# Patient Record
Sex: FEMALE | Race: BLACK OR AFRICAN AMERICAN | NOT HISPANIC OR LATINO | Employment: UNEMPLOYED | URBAN - METROPOLITAN AREA
[De-identification: names, ages, dates, MRNs, and addresses within clinical notes are randomized per-mention and may not be internally consistent; named-entity substitution may affect disease eponyms.]

---

## 2023-06-28 ENCOUNTER — NURSING HOME VISIT (OUTPATIENT)
Dept: POST ACUTE CARE | Facility: EXTERNAL LOCATION | Age: 68
End: 2023-06-28
Payer: COMMERCIAL

## 2023-06-28 DIAGNOSIS — R53.1 WEAKNESS GENERALIZED: ICD-10-CM

## 2023-06-28 DIAGNOSIS — K44.9 HERNIA, HIATAL: ICD-10-CM

## 2023-06-28 DIAGNOSIS — M54.50 CHRONIC BILATERAL LOW BACK PAIN, UNSPECIFIED WHETHER SCIATICA PRESENT: ICD-10-CM

## 2023-06-28 DIAGNOSIS — I10 PRIMARY HYPERTENSION: ICD-10-CM

## 2023-06-28 DIAGNOSIS — F33.41 RECURRENT MAJOR DEPRESSIVE DISORDER, IN PARTIAL REMISSION (CMS-HCC): ICD-10-CM

## 2023-06-28 DIAGNOSIS — G89.29 CHRONIC BILATERAL LOW BACK PAIN, UNSPECIFIED WHETHER SCIATICA PRESENT: ICD-10-CM

## 2023-06-28 DIAGNOSIS — J44.9 CHRONIC OBSTRUCTIVE PULMONARY DISEASE, UNSPECIFIED COPD TYPE (MULTI): Primary | ICD-10-CM

## 2023-06-28 PROCEDURE — 99305 1ST NF CARE MODERATE MDM 35: CPT | Performed by: INTERNAL MEDICINE

## 2023-06-28 NOTE — LETTER
Patient: Berny Mohr  : 1955    Encounter Date: 2023    HISTORY & PHYSICAL    Subjective  Chief complaint: Berny Mohr is a 67 y.o. female who is a acute skilled care patient being seen and evaluated for multiple medical problems.  Patient presents for weakness.    HPI:  Patient was admitted to the hospital after a fall at home. Patient was seen at bedside and appeared to be depressed. Patient has a substance abuse issue, states she uses substances for pain control. Pt was evaluated by psych, PT and OT and was discharged to a skilled nursing facility.         Past Medical History:   Diagnosis Date   • COPD (chronic obstructive pulmonary disease) (CMS/[facility])    • Depression    • Hypertension, essential    • PVD (peripheral vascular disease) (CMS/[facility])    • Syncope and collapse    • Tachycardia        No past surgical history on file.    Family History   Problem Relation Name Age of Onset   • No Known Problems Mother     • No Known Problems Father         Social History     Socioeconomic History   • Marital status:      Spouse name: Not on file   • Number of children: Not on file   • Years of education: Not on file   • Highest education level: Not on file   Occupational History   • Not on file   Tobacco Use   • Smoking status: Not on file   • Smokeless tobacco: Not on file   Substance and Sexual Activity   • Alcohol use: Not on file   • Drug use: Not on file   • Sexual activity: Not on file   Other Topics Concern   • Not on file   Social History Narrative   • Not on file     Social Determinants of Health     Financial Resource Strain: Not on file   Food Insecurity: Not on file   Transportation Needs: Not on file   Physical Activity: Not on file   Stress: Not on file   Social Connections: Not on file   Intimate Partner Violence: Not on file   Housing Stability: Not on file       Vital signs: 153/81, 98.1, 18, 65, 100%    Objective  Physical Exam  HENT:      Head:  Normocephalic and atraumatic.      Nose: Nose normal.      Mouth/Throat:      Mouth: Mucous membranes are moist.      Pharynx: Oropharynx is clear.   Eyes:      Extraocular Movements: Extraocular movements intact.      Pupils: Pupils are equal, round, and reactive to light.   Cardiovascular:      Rate and Rhythm: Normal rate and regular rhythm.   Pulmonary:      Effort: No respiratory distress.      Breath sounds: Normal breath sounds. No wheezing, rhonchi or rales.   Abdominal:      General: Bowel sounds are normal. There is no distension.      Palpations: Abdomen is soft.      Tenderness: There is no abdominal tenderness. There is no guarding.   Musculoskeletal:      Right lower leg: No edema.      Left lower leg: No edema.   Skin:     General: Skin is warm and dry.   Neurological:      Mental Status: She is alert. Mental status is at baseline.         Assessment/Plan  Problem List Items Addressed This Visit       Hernia, hiatal    Low back pain    Weakness generalized    COPD (chronic obstructive pulmonary disease) (CMS/HCC) - Primary    Primary hypertension    Recurrent major depressive disorder, in partial remission (CMS/[facility])     Medications, treatments, and labs reviewed  Continue medications and treatments as listed in [facility]    Scribe Attestation  I, Dorothy Marino   attest that this documentation has been prepared under the direction and in the presence of Stephon Guerrier DO.    An interactive audio and/or video telecommunication system which permits real time communications between the patient (at the originating site) and provider (at a distant site) was utilized to provide this telehealth service after obtaining verbal consent.    Provider Attestation - Scribe documentation  All medical record entries made by the Scribe were at my direction and personally dictated by me. I have reviewed the chart and agree that the record accurately reflects my personal performance of the history, physical  exam, discussion and plan.    Stephon Guerrier DO          Electronically Signed By: Stephon Guerrier DO   7/10/23  5:52 PM

## 2023-07-03 NOTE — PROGRESS NOTES
HISTORY & PHYSICAL    Subjective   Chief complaint: Berny Mohr is a 67 y.o. female who is a acute skilled care patient being seen and evaluated for multiple medical problems.  Patient presents for weakness.    HPI:  Patient was admitted to the hospital after a fall at home. Patient was seen at bedside and appeared to be depressed. Patient has a substance abuse issue, states she uses substances for pain control. Pt was evaluated by psych, PT and OT and was discharged to a skilled nursing facility.         Past Medical History:   Diagnosis Date    COPD (chronic obstructive pulmonary disease) (CMS/[facility])     Depression     Hypertension, essential     PVD (peripheral vascular disease) (CMS/[facility])     Syncope and collapse     Tachycardia        No past surgical history on file.    Family History   Problem Relation Name Age of Onset    No Known Problems Mother      No Known Problems Father         Social History     Socioeconomic History    Marital status:      Spouse name: Not on file    Number of children: Not on file    Years of education: Not on file    Highest education level: Not on file   Occupational History    Not on file   Tobacco Use    Smoking status: Not on file    Smokeless tobacco: Not on file   Substance and Sexual Activity    Alcohol use: Not on file    Drug use: Not on file    Sexual activity: Not on file   Other Topics Concern    Not on file   Social History Narrative    Not on file     Social Determinants of Health     Financial Resource Strain: Not on file   Food Insecurity: Not on file   Transportation Needs: Not on file   Physical Activity: Not on file   Stress: Not on file   Social Connections: Not on file   Intimate Partner Violence: Not on file   Housing Stability: Not on file       Vital signs: 153/81, 98.1, 18, 65, 100%    Objective   Physical Exam  HENT:      Head: Normocephalic and atraumatic.      Nose: Nose normal.      Mouth/Throat:      Mouth: Mucous membranes are moist.       Pharynx: Oropharynx is clear.   Eyes:      Extraocular Movements: Extraocular movements intact.      Pupils: Pupils are equal, round, and reactive to light.   Cardiovascular:      Rate and Rhythm: Normal rate and regular rhythm.   Pulmonary:      Effort: No respiratory distress.      Breath sounds: Normal breath sounds. No wheezing, rhonchi or rales.   Abdominal:      General: Bowel sounds are normal. There is no distension.      Palpations: Abdomen is soft.      Tenderness: There is no abdominal tenderness. There is no guarding.   Musculoskeletal:      Right lower leg: No edema.      Left lower leg: No edema.   Skin:     General: Skin is warm and dry.   Neurological:      Mental Status: She is alert. Mental status is at baseline.         Assessment/Plan   Problem List Items Addressed This Visit       Hernia, hiatal    Low back pain    Weakness generalized    COPD (chronic obstructive pulmonary disease) (CMS/[facility]) - Primary    Primary hypertension    Recurrent major depressive disorder, in partial remission (CMS/[facility])     Medications, treatments, and labs reviewed  Continue medications and treatments as listed in [facility]    Scribe Attestation  Portia MANNING Scribe   attest that this documentation has been prepared under the direction and in the presence of Stephon Guerrier DO.    An interactive audio and/or video telecommunication system which permits real time communications between the patient (at the originating site) and provider (at a distant site) was utilized to provide this telehealth service after obtaining verbal consent.    Provider Attestation - Scribe documentation  All medical record entries made by the Scribe were at my direction and personally dictated by me. I have reviewed the chart and agree that the record accurately reflects my personal performance of the history, physical exam, discussion and plan.    Stephon Guerrier DO

## 2023-07-10 PROBLEM — J44.9 COPD (CHRONIC OBSTRUCTIVE PULMONARY DISEASE) (MULTI): Status: ACTIVE | Noted: 2023-07-10

## 2023-07-10 PROBLEM — R53.1 WEAKNESS GENERALIZED: Status: ACTIVE | Noted: 2023-07-10

## 2023-07-10 PROBLEM — M54.50 LOW BACK PAIN: Status: ACTIVE | Noted: 2023-07-10

## 2023-07-10 PROBLEM — I10 PRIMARY HYPERTENSION: Status: ACTIVE | Noted: 2023-07-10

## 2023-07-10 PROBLEM — K44.9 HERNIA, HIATAL: Status: ACTIVE | Noted: 2023-07-10

## 2023-07-10 PROBLEM — F33.41 RECURRENT MAJOR DEPRESSIVE DISORDER, IN PARTIAL REMISSION (CMS-HCC): Status: ACTIVE | Noted: 2023-07-10

## 2024-05-07 NOTE — PROGRESS NOTES
"Subjective   Berny Mohr  is a 68 y.o. female who presents for evaluation of lung nodules.     The patient presented to medical attention with cough in December 2022. This had been present for several weeks, and she asked for radiographic imaging which showed a \"upper respiratory tract infection\" she was treated with antibiotics, and her cough transiently resolved but recurred. She presented to the emergency department, where she received a CT scan of the chest and was diagnosed with recurrent pneumonia. She was treated with antinbiotics. The CT scan also showed radiographic abnormalities and she presents for evaluation of these.    Currently the patient is presenting for routine follow-up, in their usual state of health, and \"feeling great\". She has gained some weight . She denies the following symptoms: chest pain, shortness of breath at rest, shortness of breath with activity, cough, hemoptysis, fevers, chills, and weight loss.      There have been no significant changes to their documented medical, surgical and family history.     She  reports that she has quit smoking. Her smoking use included cigarettes. She does not have any smokeless tobacco history on file.    Objective   Physical Exam  Phone visit  Diagnostic Studies  === 05/09/24 ===    CT CHEST WO IV CONTRAST    - Impression -  1.  Stable focal right apical scarring/fibrotic changes.  There are stable subcentimeter parenchymal lung nodules. No evidence  of suspicious lung nodules.  2. Emphysematous changes.  3. Moderate coronary artery calcifications    Signed by: Dexter Hall 5/9/2024 4:27 PM  Dictation workstation:   [id number]    Assessment/Plan   Overall, I believe that the patient is doing well.     Based on the patient's clinical presentation and my review of their radiographic imaging, I believe the lung nodule is unlikely to represent a malignant process.  We discussed various management strategies including surgery, biopsy, " and observation.  Based on this discussion, the patient elected for observational management.  I recommend serial radiographic surveillance.    I mentioned the other findings on the scan     I recommend CT chest 12 months    I discussed this in detail with the patient, including a discussion of alternatives. They were comfortable with this approach.     Time 5 min    Stephon Zhang MD  [phone]

## 2024-05-09 ENCOUNTER — APPOINTMENT (OUTPATIENT)
Dept: RADIOLOGY | Facility: HOSPITAL | Age: 69
End: 2024-05-09
Payer: COMMERCIAL

## 2024-05-09 ENCOUNTER — HOSPITAL ENCOUNTER (OUTPATIENT)
Dept: RADIOLOGY | Facility: HOSPITAL | Age: 69
Discharge: HOME | End: 2024-05-09
Payer: COMMERCIAL

## 2024-05-09 DIAGNOSIS — R91.1 LUNG NODULE: ICD-10-CM

## 2024-05-09 PROCEDURE — 71250 CT THORAX DX C-: CPT | Performed by: RADIOLOGY

## 2024-05-09 PROCEDURE — 71250 CT THORAX DX C-: CPT

## 2024-05-16 ENCOUNTER — TELEMEDICINE (OUTPATIENT)
Dept: SURGERY | Facility: HOSPITAL | Age: 69
End: 2024-05-16
Payer: COMMERCIAL

## 2024-05-16 DIAGNOSIS — R91.1 LUNG NODULE: ICD-10-CM

## 2024-05-16 DIAGNOSIS — R91.8 MULTIPLE LUNG NODULES: Primary | ICD-10-CM

## 2024-05-16 PROCEDURE — 99441 PR PHYS/QHP TELEPHONE EVALUATION 5-10 MIN: CPT | Performed by: THORACIC SURGERY (CARDIOTHORACIC VASCULAR SURGERY)

## 2024-07-24 PROBLEM — R06.02 SHORTNESS OF BREATH: Status: ACTIVE | Noted: 2024-07-24

## 2024-07-24 PROBLEM — F19.10 PSYCHOACTIVE SUBSTANCE ABUSE (MULTI): Status: ACTIVE | Noted: 2024-07-24

## 2024-07-24 PROBLEM — I73.9 PVD (PERIPHERAL VASCULAR DISEASE) (CMS-HCC): Status: ACTIVE | Noted: 2024-07-24

## 2024-07-24 PROBLEM — I48.0 PAROXYSMAL ATRIAL FIBRILLATION (MULTI): Status: ACTIVE | Noted: 2024-07-24

## 2024-07-24 PROBLEM — K59.01 SLOW TRANSIT CONSTIPATION: Status: ACTIVE | Noted: 2024-07-24

## 2024-07-24 PROBLEM — R46.89 AGGRESSIVE BEHAVIOR: Status: ACTIVE | Noted: 2024-07-24

## 2025-05-01 NOTE — PROGRESS NOTES
"Subjective   Berny Mohr  is a 69 y.o. female who presents for evaluation of lung nodules.     The patient presented to medical attention with cough in December 2022. This had been present for several weeks, and she asked for radiographic imaging which showed a \"upper respiratory tract infection\" she was treated with antibiotics, and her cough transiently resolved but recurred. She presented to the emergency department, where she received a CT scan of the chest and was diagnosed with recurrent pneumonia. She was treated with antinbiotics. The CT scan also showed radiographic abnormalities and she presents for evaluation of these.    Currently the patient is {Usual state of health:53894}. She {T report deny:24494}. {WILDorBLANK:72933::\" \"}    {T [facility] stuff:88309}    She  reports that she has quit smoking. Her smoking use included cigarettes. She does not have any smokeless tobacco history on file.    Objective   Physical Exam  {T exam:44826}  Diagnostic Studies  {CWT reviewed:92122}    Assessment/Plan   I believe that the patient {CWT doing well:04941}.     {CWT plan smart text:84995}    I recommend {CWTworkup:31200}    I discussed this in detail with the patient, including a discussion of alternatives. They were comfortable with this approach.     Stephon Zhang MD  [phone]    "

## 2025-05-05 ENCOUNTER — APPOINTMENT (OUTPATIENT)
Dept: RADIOLOGY | Facility: HOSPITAL | Age: 70
End: 2025-05-05
Payer: COMMERCIAL

## 2025-05-08 ENCOUNTER — APPOINTMENT (OUTPATIENT)
Dept: SURGERY | Facility: HOSPITAL | Age: 70
End: 2025-05-08
Payer: COMMERCIAL

## 2025-05-12 ENCOUNTER — APPOINTMENT (OUTPATIENT)
Dept: RADIOLOGY | Facility: HOSPITAL | Age: 70
End: 2025-05-12
Payer: COMMERCIAL

## 2025-05-12 DIAGNOSIS — R91.1 LUNG NODULE: ICD-10-CM

## 2025-05-19 NOTE — PROGRESS NOTES
"Subjective   Berny Mohr  is a 69 y.o. female who presents for evaluation of lung nodules.     The patient presented to medical attention with cough in December 2022. This had been present for several weeks, and she asked for radiographic imaging which showed a \"upper respiratory tract infection\" she was treated with antibiotics, and her cough transiently resolved but recurred. She presented to the emergency department, where she received a CT scan of the chest and was diagnosed with recurrent pneumonia. She was treated with antinbiotics. The CT scan also showed radiographic abnormalities and she presents for evaluation of these.    Currently the patient is {Usual state of health:48703}. She {T report deny:23929}. {WILDorBLANK:28546::\" \"}    {T [facility] stuff:78635}    She  reports that she has quit smoking. Her smoking use included cigarettes. She does not have any smokeless tobacco history on file.    Objective   Physical Exam  {T exam:66861}  Diagnostic Studies  {CWT reviewed:12915}    Assessment/Plan   I believe that the patient {CWT doing well:67871}.     {CWT plan smart text:06243}    I recommend {CWTworkup:94132}    I discussed this in detail with the patient, including a discussion of alternatives. They were comfortable with this approach.     Stephon Zhang MD  [phone]    "

## 2025-05-22 ENCOUNTER — APPOINTMENT (OUTPATIENT)
Dept: RADIOLOGY | Facility: HOSPITAL | Age: 70
End: 2025-05-22
Payer: COMMERCIAL

## 2025-05-29 ENCOUNTER — APPOINTMENT (OUTPATIENT)
Dept: SURGERY | Facility: HOSPITAL | Age: 70
End: 2025-05-29
Payer: COMMERCIAL

## 2025-05-29 NOTE — PROGRESS NOTES
"Subjective   Berny Mohr  is a 69 y.o. female who presents for evaluation of lung nodules.     The patient presented to medical attention with cough in December 2022. This had been present for several weeks, and she asked for radiographic imaging which showed a \"upper respiratory tract infection\" she was treated with antibiotics, and her cough transiently resolved but recurred. She presented to the emergency department, where she received a CT scan of the chest and was diagnosed with recurrent pneumonia. She was treated with antinbiotics. The CT scan also showed radiographic abnormalities and she presents for evaluation of these.    Currently the patient is presenting for routine follow-up and in their usual state of health. \"I'm feeling fine\" She denies the following symptoms: chest pain, shortness of breath at rest, shortness of breath with activity, cough, hemoptysis, fevers, chills, and weight loss.      There have been no significant changes to their documented medical, surgical and family history.     She  reports that she has quit smoking. Her smoking use included cigarettes. She does not have any smokeless tobacco history on file.    Objective   Physical Exam  Phone visit (audio only evaluation - patient declined request to have a video visit)   Diagnostic Studies  === 06/02/25 ===    CT CHEST WO IV CONTRAST    - Impression -  1. New consolidative opacities throughout the left lower lobe with  tree-in-bud nodularity throughout the right upper and right middle  lobes as described concerning for infectious process likely in the  setting of airway disease and bronchitis/bronchiolitis. Posttreatment  short-term follow-up non-contrast CT chest is recommended in 6-8  weeks to ensure resolution.  2. Stable noncalcified pulmonary nodules throughout the right upper  and left lower lobes with new scattered centrilobular distributed  micronodular densities which can represent pulmonary nodules " versus  mucous plugging. This can also be further assessed on follow-up CT  for further delineation.  3. Background of moderate centrilobular and paraseptal emphysema with  findings of chronic bronchitis.  4. Additional incidental non-acute findings as detailed above.      I personally reviewed the images/study and I agree with the findings  as stated by Dr. Keon Dale. This study was interpreted at [facility], [place], [place].    MACRO:  Critical Finding:  See findings. Notification was initiated on  6/2/2025 at 4:00 pm by  Francisca Weir.  (**-YCF-**)  Instructions:    Signed by: Francisca Weir 6/2/2025 4:00 PM  Dictation workstation:   [id number]    Assessment/Plan   I believe that the patient is doing well.     Based on the patient's clinical presentation and my review of their radiographic imaging, I believe the lung nodule is unlikely to represent a malignant process.  She was admitted to another hospital in September 2024 for upper respiratory tract infection, and these findings may be a sequelae of that. Certainyl I am pleased that she is feeeling well.  We discussed various management strategies including surgery, biopsy, and observation.  Based on this discussion, the patient elected for observational management.  I recommend serial radiographic surveillance.    I recommend CT chest in 2 months (phone call)    I discussed this in detail with the patient, including a discussion of alternatives. They were comfortable with this approach.     Stephon Zhang MD  [phone]  They declined video call, consent obtained, Time: 10  min.

## 2025-05-30 ENCOUNTER — APPOINTMENT (OUTPATIENT)
Dept: RADIOLOGY | Facility: HOSPITAL | Age: 70
End: 2025-05-30
Payer: COMMERCIAL

## 2025-06-02 ENCOUNTER — HOSPITAL ENCOUNTER (OUTPATIENT)
Dept: RADIOLOGY | Facility: HOSPITAL | Age: 70
Discharge: HOME | End: 2025-06-02
Payer: COMMERCIAL

## 2025-06-02 DIAGNOSIS — R91.1 LUNG NODULE: ICD-10-CM

## 2025-06-02 PROCEDURE — 71250 CT THORAX DX C-: CPT | Performed by: RADIOLOGY

## 2025-06-02 PROCEDURE — 71250 CT THORAX DX C-: CPT

## 2025-06-05 ENCOUNTER — TELEMEDICINE (OUTPATIENT)
Dept: SURGERY | Facility: HOSPITAL | Age: 70
End: 2025-06-05
Payer: COMMERCIAL

## 2025-06-05 DIAGNOSIS — R91.8 MULTIPLE LUNG NODULES: Primary | ICD-10-CM

## 2025-06-05 PROCEDURE — 99212 OFFICE O/P EST SF 10 MIN: CPT | Performed by: THORACIC SURGERY (CARDIOTHORACIC VASCULAR SURGERY)

## 2025-08-04 NOTE — PROGRESS NOTES
"Subjective   Berny Mohr  is a 69 y.o. female who presents for evaluation of lung nodules.     The patient presented to medical attention with cough in December 2022. This had been present for several weeks, and she asked for radiographic imaging which showed a \"upper respiratory tract infection\" she was treated with antibiotics, and her cough transiently resolved but recurred. She presented to the emergency department, where she received a CT scan of the chest and was diagnosed with recurrent pneumonia. She was treated with antinbiotics. The CT scan also showed radiographic abnormalities and she presents for evaluation of these.    Currently the patient is {Usual state of health:87620}. She {T report deny:70284}. {WILDorBLANK:21890::\" \"}    {T [facility] stuff:72188}    She  reports that she has quit smoking. Her smoking use included cigarettes. She does not have any smokeless tobacco history on file.    Objective   Physical Exam  {T exam:06934}  Diagnostic Studies  {CWT reviewed:96450}    Assessment/Plan   I believe that the patient {CWT doing well:61683}.     {CWT plan smart text:13797}    I recommend {CWTworkup:11985}    I discussed this in detail with the patient, including a discussion of alternatives. They were comfortable with this approach.     Stephon Zhang MD  [phone]    "

## 2025-08-07 ENCOUNTER — APPOINTMENT (OUTPATIENT)
Dept: SURGERY | Facility: HOSPITAL | Age: 70
End: 2025-08-07
Payer: COMMERCIAL

## 2025-08-12 ENCOUNTER — HOSPITAL ENCOUNTER (OUTPATIENT)
Dept: RADIOLOGY | Facility: HOSPITAL | Age: 70
Discharge: HOME | End: 2025-08-12
Payer: COMMERCIAL

## 2025-08-12 DIAGNOSIS — R91.8 MULTIPLE LUNG NODULES: ICD-10-CM

## 2025-08-12 PROCEDURE — 71250 CT THORAX DX C-: CPT

## 2025-08-12 PROCEDURE — 71250 CT THORAX DX C-: CPT | Performed by: RADIOLOGY

## 2025-08-20 ENCOUNTER — TELEMEDICINE (OUTPATIENT)
Dept: SURGERY | Facility: CLINIC | Age: 70
End: 2025-08-20
Payer: COMMERCIAL

## 2025-08-20 DIAGNOSIS — R91.1 LUNG NODULE: ICD-10-CM

## 2025-08-20 DIAGNOSIS — R91.8 MULTIPLE LUNG NODULES: Primary | ICD-10-CM

## 2025-08-20 PROCEDURE — 1159F MED LIST DOCD IN RCRD: CPT | Performed by: THORACIC SURGERY (CARDIOTHORACIC VASCULAR SURGERY)

## 2025-08-20 PROCEDURE — 98012 SYNCH AUDIO-ONLY EST SF 10: CPT | Performed by: THORACIC SURGERY (CARDIOTHORACIC VASCULAR SURGERY)

## 2025-08-20 RX ORDER — CLOPIDOGREL BISULFATE 75 MG/1
75 TABLET ORAL DAILY
COMMUNITY
Start: 2025-02-11

## 2025-08-20 RX ORDER — ATORVASTATIN CALCIUM 80 MG/1
80 TABLET, FILM COATED ORAL DAILY
COMMUNITY

## 2025-08-20 RX ORDER — ALBUTEROL SULFATE 90 UG/1
INHALANT RESPIRATORY (INHALATION) 4 TIMES DAILY
COMMUNITY
Start: 2022-12-04

## 2025-08-20 RX ORDER — ASPIRIN 81 MG/1
81 TABLET ORAL DAILY
COMMUNITY

## 2025-08-20 RX ORDER — ACETAMINOPHEN 500 MG
50 TABLET ORAL DAILY
COMMUNITY

## 2025-08-20 RX ORDER — VALSARTAN AND HYDROCHLOROTHIAZIDE 80; 12.5 MG/1; MG/1
1 TABLET, FILM COATED ORAL
COMMUNITY
Start: 2024-12-09

## 2025-08-20 RX ORDER — AMLODIPINE BESYLATE 5 MG/1
1 TABLET ORAL
COMMUNITY
Start: 2024-09-24

## 2025-08-20 RX ORDER — METOPROLOL SUCCINATE 25 MG/1
25 TABLET, EXTENDED RELEASE ORAL DAILY
COMMUNITY
Start: 2025-07-05

## 2025-08-20 RX ORDER — NITROGLYCERIN 0.3 MG/1
0.3 TABLET SUBLINGUAL EVERY 5 MIN PRN
COMMUNITY
Start: 2024-12-09

## 2025-08-20 ASSESSMENT — LIFESTYLE VARIABLES
HOW MANY STANDARD DRINKS CONTAINING ALCOHOL DO YOU HAVE ON A TYPICAL DAY: PATIENT DOES NOT DRINK
SKIP TO QUESTIONS 9-10: 1
HOW OFTEN DO YOU HAVE SIX OR MORE DRINKS ON ONE OCCASION: NEVER
AUDIT-C TOTAL SCORE: 0
HOW OFTEN DO YOU HAVE A DRINK CONTAINING ALCOHOL: NEVER

## 2025-08-20 ASSESSMENT — ENCOUNTER SYMPTOMS
DEPRESSION: 0
LOSS OF SENSATION IN FEET: 0
OCCASIONAL FEELINGS OF UNSTEADINESS: 0